# Patient Record
(demographics unavailable — no encounter records)

---

## 2024-11-11 NOTE — DISCUSSION/SUMMARY
[de-identified] : Chief complaint: Left elbow pain, right hand pain  HPI: Patient is a 73-year-old left-hand-dominant female who presents the office today for evaluation of left elbow pain as well as right hand pain which manifested originally on 11/9/2024.  Patient is accompanied by her daughter.  Daughter reports that the patient was walking in the kitchen when she slipped and fell injuring the left elbow as well as the right hand.  Patient presented to [facility] that same day on 11/9/2024 at which time x-rays of the left elbow and the left humerus were performed with impression as per the radiologist of the supracondylar/intercondylar intra-articular comminuted displaced fracture.  Patient was placed in a posterior long-arm splint with an overlying Ace wrap.  X-rays were also taken of the right hand with impression as per the radiologist of an acute mildly displaced and impacted fracture of the fifth metacarpal neck with dorsal apex angulation.  Patient was told to follow-up with orthopedics.  Patient's daughter states that the patient has not had a previous injury to the left elbow or the right hand.  She has a congenital deformity of the right hand.  ROS: Positive for left elbow fracture, right hand fracture  Physical examination of the left upper extremity:  Posterior long-arm splint noted with overlying Ace wrap No significant appreciable edema to the exposed digits/left hand Patient is able to move all of the fingers of the left hand Distal sensation is grossly intact to light touch Capillary refills less than 2 seconds Tenderness to palpation over the diffuse left elbow No appreciable tenderness to palpation over the left shoulder  Physical examination of the right hand:  Congenital deformity appreciated to diffuse hand Mild edema noted diffusely Ecchymosis noted over the palmar aspect of the hand Skin is intact Patient is able to flex and extend at the DIP, PIP, and MP joints of the right fifth finger Tenderness to palpation over the MP joint and over the distal aspect of the fifth metacarpal Good range of motion to the right wrist  Patient presented to [facility] on 11/9/2024 at which time three-view x-rays of the right hand were performed with impression as per the radiologist: Bones are osteopenic.  Acute mildly displaced and impacted fracture of the fifth metacarpal neck with dorsal apex angulation.  Generalized soft tissue swelling.  Osteoarthritic degenerative changes.  No radiopaque foreign body.  Patient also had 2 view x-rays of the left elbow performed at [facility] on 11/9/2024 with impression as per the radiologist: Supracondylar/intercondylar intra-articular comminuted displaced fracture  Patient had x-rays of the left humerus performed at [facility] on 9/20/2024 with impression as per the radiologist: Supracondylar/intercondylar intra-articular comminuted displaced fracture.  Greater humeral tuberosity calcific peritendinitis.  AC joint arthrosis/ossicle.  Assessment/plan: Acute closed supracondylar/intercondylar intra-articular comminuted displaced fracture of the left elbow, discussed treatment options with the patient and with her daughter, given the nature of the patient's fracture discussed with the patient and with her daughter that surgical intervention will almost certainly be recommended, at this time the patient is to remain in the posterior long-arm splint at all times, I discussed with the patient that she should continue to move the digits of the left upper extremity while in the splint, recommended elevation of the left upper extremity when resting to help with edema management, patient was prescribed pain medication from the emergency department, she can continue to take this as prescribed and supplement with over-the-counter Tylenol, patient can apply ice to the left elbow on an as-needed basis with sensory precautions with care not to get the splint wet  In regards to the right fifth metacarpal neck fracture this case was discussed with Dr. Weston and treatment options were discussed with the patient and with her daughter, I informed the patient and her daughter that treatment options include dorsal blocking cast placement to the right upper extremity including the fourth and fifth digits versus buddy taping the fourth and fifth digits of the right hand, they understand that that if they opt for buddy tape that the patient will likely have an extensor lag to the right fifth finger, she may have changes in her  strength, the digit will be shorter, and she will have a lifelong deformity, patient and her daughter verbalized understanding of all these associated risks of buddy taping versus dorsal blocking casting and they opted for buddy taping at this time, today the patient's fourth and fifth digits of the right hand were buddy taped, I recommended that they wear the buddy tape at all times, patient can take the aforementioned pain medication and supplement with Tylenol for pain  Patient will be provided with a follow-up with Dr. Oliveira tomorrow, 11/12/2024 for surgical consultation in regards to her left elbow fracture, they verbalized understanding of all findings in the office today and they agree to follow-up as directed

## 2024-11-12 NOTE — ASSESSMENT
[FreeTextEntry1] : 73-year-old woman with a comminuted left medial distal humeral fracture and a right minimally displaced fifth metacarpal neck fracture.  Recommend nonoperative management of the fifth metacarpal fracture with symptomatic treatment with splinting as needed.  Recommend ORIF of the left distal humeral fracture.  Discussed findings diagnosis and recommendations at length with the patient and daughter.  They would like to proceed with surgery on the patient's left elbow.  She will need preadmission testing and a CAT scan of her left elbow prior to surgery.  Will tentatively plan surgery next week.  All questions answered to her satisfaction.  Final note recommended extra strength Tylenol as opposed oxycodone for pain relief prior to surgery.

## 2024-11-12 NOTE — HISTORY OF PRESENT ILLNESS
[de-identified] : 73-year-old right-hand-dominant woman fell in her kitchen November 9, 2024 resulting in a left medial distal humeral fracture and right fifth metacarpal fractures.  Seen at [facility] her left elbow was splinted and she was referred to my office for management.  Patient was initially on oxycodone for pain relief but finds this too strong.  Has questions about taking Tylenol.  No complaints of numbness in her fingers.  Notes that her hand pain has been improving.  Presents today accompanied by her daughter.  Past medical history: Hypertension Hyperlipidemia  Medications: Antihypertensive Statin?  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

## 2024-11-12 NOTE — IMAGING
[de-identified] : Brynn late middle-aged Bangl[place]hi woman walks into my office accompanied by her daughter.  She speaks English with good comprehension.  She is in no distress.  Physical examination: Left elbow: Splint is in place.  Able to wiggle her fingers.  Grossly intact sensation about her fingers.  Good capillary refill.  Right hand: Minimal if any tenderness palpation over the fifth metatarsal head.  Able to range her fingers without discomfort.  Fifth met carpal phalangeal joint demonstrates 0 to 90 degree arc of motion without undue discomfort.  Able to make a fist nearly.  Normal light sensation about her finger.  Radiographs reviewed from [facility] of the patient's left elbow and right hand.  Left elbow radiographs demonstrate a comminuted left medial condyle fracture.  Right hand radiographs demonstrate a minimally displaced fifth metacarpal fracture.

## 2024-12-03 NOTE — ASSESSMENT
[FreeTextEntry1] : 2 weeks status post open reduction internal fixation of a left precollege humerus fracture healing uneventfully.  I like her to get started in physical therapy working on elbow motion and forearm rotation.  She is a 1 pound lifting restriction.  Modalities can utilize adjunct therapy.  Therapy will also work on hand function and  strength.  She can continue with Tylenol as needed for pain relief.  Wounds of healed sufficiently for staple removal.  May shower but should avoid baths for 2 weeks.  Will have her follow-up in my office in 6 to 8 weeks time for repeat evaluation radiographs of her left elbow.  Any problems or evidence of extender.  All questions answered to her satisfaction she agrees with the plan.

## 2024-12-03 NOTE — HISTORY OF PRESENT ILLNESS
[de-identified] : 73-year-old woman returns first postoperative visit status post open reduction internal fixation of a supracondylar humerus fracture intercondylar extension on November 20, 2024.  Patient notes the pain is largely resolved.  Does not routinely require any pain medication.  Denies any sensory complaints.  No fevers or drainage.  Remains in splint.  Eager to begin working on elbow motion.  Interview conducted with  #[id number].

## 2024-12-03 NOTE — IMAGING
[de-identified] : Brynn late middle-aged woman sits comfortably my office in no distress.  Physical examination: Left elbow: Splint in good condition.  With splint removed surgical incisions clean dry intact no erythema duration fluctuance.  Able to extend wrist above the plane of her forearm.  Able to make a  but has decreased  strength.  Normal light sensation about her fingers and radial ulnar median nerve distribution.  Good capillary refill.  Elbow motion is 45 to 90 degrees from prolonged immobilization.  Radiographs: Left elbow (AP, lateral): Healing bicondylar tibial plateau fracture with a medially based fixation.  Alignment maintained.  Hardware in place.  No hardware violates the joint surface.

## 2025-01-14 NOTE — ASSESSMENT
[FreeTextEntry1] : 73-year-old woman 6-week status post open reduction droll fixation of the left medial condyle fracture healing uneventfully.  Recommend continued physical therapy.  Reassured about sensation with small fingers.  Reassured about range of motion.  Will have her follow-up in my office in 8 weeks time for repeat evaluation with radiographs of her left elbow.  Any problems am happy to see her back sooner.  All questions answered to his satisfaction and she agrees with the plan.

## 2025-01-14 NOTE — IMAGING
[de-identified] : Brynn middle-age woman sits comfortably my office no distress.  Physical examination: Left elbow: Surgical incision healed started to remodel.  No epitrochlear nodes.  No undue swelling.  Elbow range of motion 20 to 125 degrees.  Intact intrinsic hand function about her fingers.  Unrestricted wrist and digital range of motion.  Still has subjective decrease sensation ulnar nerve distribution.  Radiographs: Left elbow (AP, lateral): Anatomically aligned left medial condyle fracture.  Hardware remains in place.  No evidence of loosening.

## 2025-01-14 NOTE — HISTORY OF PRESENT ILLNESS
[de-identified] : 73-year-old woman returns status post operative control fixation left medial condyle fracture distal humerus November 20, 2024.  No history of fevers or drainage.  Still has dysesthetic sensation small fingers.  No longer requiring regular pain medication.  Started physical therapy.  Finds it helpful.  Still has not regained arc of motion satisfactory.  Would like to touch her face with greater facility.  Does not routinely require any pain medication.

## 2025-02-07 NOTE — IMAGING
[de-identified] : Physical exam of the left wrist and left hand: No edema, no erythema or ecchymosis.  Good range of motion with extension, flexion, ulnar and radial deviation of the left wrist.  Mild tenderness to palpation over the dorsal surface of the wrist.  No tenderness to palpation over the distal radius of the distal ulna.  No tenderness to palpation over the anatomic snuffbox or the TFCC.  No tenderness to palpation over the metacarpals of the left hand.  No tenderness to palpation over the digits of the left hand.  There is active triggering of the left thumb here in the office today.  She has tenderness to palpation over the A1 pulley of the left thumb.

## 2025-02-07 NOTE — DISCUSSION/SUMMARY
[de-identified] : I reviewed the x-rays from [facility] of the left hand with the patient.  Regarding the left thumb trigger finger, I recommend a cortisone injection, she agreed.  The area was cleansed and prepped in the usual sterile fashion and 1 cc lidocaine, 1 cc dexamethasone was injected into the A1 pulley of the left thumb.  The puncture site was covered with a Band-Aid, she tolerated the procedure well.  Today she was transitioned to a cock up wrist brace for the arthritis in the left hand.  She may remove the brace at her leisure.  Rx for ibuprofen 600 mg sent to the pharmacy.  She will follow-up in 1 month for further evaluation with Dr. Sexton.

## 2025-02-07 NOTE — HISTORY OF PRESENT ILLNESS
[de-identified] : The patient is a 73-year-old female left-hand-dominant here for evaluation of her left hand.  On 2/2/2025 she woke up at 11 PM out of sleep with burning and pain in her left hand.  She was seen and evaluated at [facility] and had an x-ray of the left hand that was negative for fracture but did show osteoarthritis of the MCP joints, PIP joints and DIP joints.  She also showed me her thumb here in the office today which is triggering.  This happened the same night as well.  She is status post ORIF for left medial condyle fracture, 6-1/2 weeks out.  She sees Dr. Oliveira for that.  She presents today in a volar splint.

## 2025-02-07 NOTE — PROCEDURE
[Tendon Sheath] : tendon sheath [Left] : of the left [Other: ____] : [unfilled] [Pain] : pain [Alcohol] : alcohol [___ cc    1%] : Lidocaine ~Vcc of 1%  [___ cc    4mg] : Dexamethasone (Decadron) ~Vcc of 4 mg  [Risks, benefits, alternatives discussed / Verbal consent obtained] : the risks benefits, and alternatives have been discussed, and verbal consent was obtained

## 2025-03-06 NOTE — ASSESSMENT
[FreeTextEntry1] : 73-year-old woman who has healed her elbow fractures at 4 months.  Now has hand symptoms that are I think not really related to her hand elbow injury.  She probably has some carpal tunnel as well as clear triggering of the thumb and index finger.  These have not responded to injection provided by the orthopedist at the walk-in clinic.  Regarding her elbow there is not much more I would recommend for her elbow and I think she should focus on her hand.  She is scheduled to see the hand surgeons next week and they can talk with her about surgeries to release her trigger fingers of her thumb and index finger and possibly carpal tunnel release.  Will have her follow-up for her elbow on an as-needed basis.  If she has any problems she is welcome back at any time.

## 2025-03-06 NOTE — IMAGING
[de-identified] : Pleasant middle-age woman sits comfortably my office no distress.  Physical examination: Left elbow: Surgical incision is healed and remodeled.  Elbow motion: 5-130 degrees with the full supination pronation arc.  No tenderness palpation over the medial epicondyle or the medial aspects of her elbow.  No instability.  Left hand and wrist: Equivocal median nerve compression test and Tinel's over the carpal tunnel.  Clear trigger triggering of the thumb and index finger.  No thenar or hypothenar wasting.  Unclear if she has dysesthetic sensation in median nerve distribution.  Reports normal light sensation over the ulnar digits.  Radiographs: Left elbow (AP, lateral): Healed left medial condyle fracture.  Hardware remains in place no evidence of loosening.

## 2025-03-06 NOTE — HISTORY OF PRESENT ILLNESS
[de-identified] : 73-year-old woman returns for interval follow-up status post open reduction internal fixation of a left medial distal humeral condyle fracture on November 20, 2024.  She was last seen in my office in January.  At that time she was doing quite well.  Recommended continued physical therapy in the interim she is developing numbness in the median nerve distribution as well as triggering about her thumb and index finger of the left hand.  In the walk-in clinic and provided an injection in her thumb and index finger but still has the pain.  She is still on the wrist splint but still has terrible pain in her fingers.  She is more concerned about her hand and she is her elbow at this point in time.  She finds her hand issues are precluding any real meaningful participation physical therapy for her elbow.  Any fevers or drainage.  No ulnar-sided numbness or sensory complaints.

## 2025-03-06 NOTE — REASON FOR VISIT
[FreeTextEntry2] : Follow-up left medial condyle fracture; complaints of triggering thumb and index finger status post injection

## 2025-03-11 NOTE — ASSESSMENT
[FreeTextEntry1] :  The patient is being seen today for pain and burning in her left thumb. She saw benefit to the pain in her left thumb from the injection. When she sleeps, she wakes up from tingling in her left thumb. She has been wearing a wrist brace at night.  L hand:  Tender volar wrist  Good finger ROM  +Tinels  +Phalens  +Compression test  Decreased sensation median nerve distribution  L hand:  Mild swelling  Tender 1st A1 pulley  Decreased thumb ROM  +thumb triggering  The patient was advised of the diagnosis.  The natural history of the pathology was explained in full to the patient in layman's terms. We discussed the nature of the nerve as an electrical cable and what happens to the nerve in carpal tunnel syndrome.  We discussed that treatment for night symptoms included night bracing.  We discussed the possibility of injection when symptoms were intermittent or in patients who were unwilling to undergo surgery with constant symptoms.  We discussed that injection is a diagnostic and therapeutic aide and what this means.  We discussed the use of nerve testing in cases when diagnosis was in doubt or for confirmation to exclude alternate pathology.  We discussed that if symptoms were 24/7 surgery was recommended to give the nerve the best chance to recover but that once symptoms were constant, the nerve may not recover even with surgery.  We discussed that if left alone the nerve progression could worsen and that treatment was indicated to prevent progression of nerve compression.  The longer the nerve is left, the more likely to cause worsening irreversible damage.  All questions were answered.  The risks and benefits of surgical and non-surgical treatment alternatives were explained in full to the patient.  She will have an EMG done to evaluate the severity of the carpal tunnel syndrome. We discussed we will likely be discussing surgery at her next appointment.   My impression is that this patient has stenosing tenosynovitis of the finger, more commonly known as a trigger finger.  I recommended that the patient undergo a trigger finger injection. The risks, benefits, and alternatives were discussed with the patient in detail. This included (but was not limited to) pain, infection, subcutaneous atrophy, skin depigmentation, elevated glucose etc... The patient agreed to undergo a steroid injection. Under informed consent and sterile conditions, 0.1 cc of 1% plain lidocaine  and 0.9 cc of dexamethasone (4mg/mL) was precisely injected into the patient's finger. A sterile Band-Aid was placed.  It is my hope that this significantly alleviates the patient's symptoms.  The patient opted for injection into the left thumb flexor tendon sheath. They tolerated the injection well.  She will follow up after her EMG for evaluation. She can cancel if she is better. We discussed if it is not improved we will discuss scheduling a triger finger release at the same time as her carpal tunnel release.

## 2025-05-06 NOTE — ASSESSMENT
[FreeTextEntry1] :  The patient is being seen today for pain and burning in her left thumb. She saw some benefit from the injection at her last visit, she has some improved motion in the thumb. She still has numbness in the middle finger and burning pain at night in the left hand. She takes ibuprofen for her pain.   L hand:  Tender volar wrist  Good finger ROM  +Tinels  +Phalens  +Compression test  Decreased sensation median nerve distribution, long finger  Mild swelling  Tender 1st A1 pulley  Decreased thumb ROM  +thumb triggering   EMG/NCV results show electrophysiological evidence of compression of the left median nerve at the wrist consistent with left carpal tunnel syndrome  The patient was advised of the diagnosis.  The natural history of the pathology was explained in full to the patient in layman's terms. We discussed the nature of the nerve as an electrical cable and what happens to the nerve in carpal tunnel syndrome.  We discussed that treatment for night symptoms included night bracing.  We discussed the possibility of injection when symptoms were intermittent or in patients who were unwilling to undergo surgery with constant symptoms.  We discussed that injection is a diagnostic and therapeutic aide and what this means.  We discussed the use of nerve testing in cases when diagnosis was in doubt or for confirmation to exclude alternate pathology.  We discussed that if symptoms were 24/7 surgery was recommended to give the nerve the best chance to recover but that once symptoms were constant, the nerve may not recover even with surgery.  We discussed that if left alone the nerve progression could worsen and that treatment was indicated to prevent progression of nerve compression.  The longer the nerve is left, the more likely to cause worsening irreversible damage.  All questions were answered.  The risks and benefits of surgical and non-surgical treatment alternatives were explained in full to the patient. We discussed the recommendation for carpal tunnel surgery- We reviewed that the goal of surgery is to prevent worsening and give the nerve a chance to recover. If symptoms are constant there is a chance that the nerve is already too badly damaged and no longer has the potential to recover. Risks, benefits, and alternatives of surgery discussed with patient (and family).Risks including but not limited to infection, blood loss, tendon damage, muscle damage, nerve damage, stiffness, pain, no resolution of symptoms, CRPS, loss of function, potential for secondary surgery, loss of limb or life. Patient understands and was allowed to voice questions or concerns. They agree to surgery for the left side.   We again reviewed the anatomy of the flexor sheath and pathology of trigger fingers with the use of drawings and discussion.  The patient has failed injections/nonoperative treatment.  We discussed the possibility of surgery including the use of an open trigger finger release.  We discussed that too many injections may lead to weakening of the tendon/tendon rupture and that surgery is indicated at this point.  Risks include bleeding, infection, injury to nerves, vessels, tendons, stiffness, pain, loss of range of motion, loss of function, CRPS, and risks and complications of anesthesia.  We discussed the surgical plan and post op expectations.  We also discussed the possibility of prolonged pain/scar tissue and the possible need for therapy.  The patient is amenable to the risks, had the opportunity to ask questions, all questions were answered and the patient signed consent of their own accord for the tfr to be done at the same time as the ctr.  They will be contacted by my surgical scheduler.

## 2025-05-29 NOTE — ASSESSMENT
[FreeTextEntry1] : 73-year-old female here for a  first postop evaluation status post hand/wrist carpal tunnel release. Patient is doing well overall.  Denies any signs of infection.  Denies any active bleeding or discharge.  Denies any numbness or tingling.  Reports pain is well-controlled.  Physical examination of the left thumb and left hand/wrist: Mild thumb hand and wrist swelling Sutures removed No locking or clicking of the thumb.  Mild tenderness at the surgical site. Healed incision No evidence of infection.  No active bleeding or discharge.  No erythema.  No foul odors. Mild tenderness at the surgical site Good hand, wrist, and finger ROM  The patient's sutures were removed in the office today.  The surgical incision site is clean dry and intact and without any signs of infection.  Red flag symptoms were discussed. We discussed scar massage.  Encouraged gentle range of motion of the wrist and fingers.  Encouraged patient to work on making a full fist. Advised to refrain from heavy lifting/pulling/pushing until repeat evaluation. Patient understands that some residual pain, swelling, and numbness/tingling following surgery is normal and expected.  Patient understands that any numbness/tingling experienced after 6 months is likely permanent. All questions and concerns addressed to patient's satisfaction. Patient expresses full understanding of treatment plan. I will give the patient follow-up in 1 month for repeat evaluation.

## 2025-07-03 NOTE — ASSESSMENT
[FreeTextEntry1] : 73-year-old female here for a second postop evaluation status post hand/wrist carpal tunnel release. Patient is doing well overall.  Denies any signs of infection.  Denies any active bleeding or discharge.  Denies any numbness or tingling.  Reports pain is well-controlled.  Physical examination of the left thumb and left hand/wrist: Mild thumb hand and wrist swelling No locking or clicking of the thumb.  Mild tenderness at the surgical site. Healed incision No evidence of infection.  No active bleeding or discharge.  No erythema.  No foul odors. Mild tenderness at the surgical site Good hand, wrist, and finger ROM    The surgical incision site is clean dry and intact and without any signs of infection.  Red flag symptoms were discussed. We discussed scar massage.  Encouraged gentle range of motion of the wrist and fingers.  Encouraged patient to work on making a full fist. Advised to refrain from heavy lifting/pulling/pushing until repeat evaluation. Patient understands that some residual pain, swelling, and numbness/tingling following surgery is normal and expected.  Patient understands that any numbness/tingling experienced after 6 months is likely permanent. All questions and concerns addressed to patient's satisfaction. Patient expresses full understanding of treatment plan. I will give the patient follow-up in 1 month for repeat evaluation.